# Patient Record
Sex: FEMALE
[De-identification: names, ages, dates, MRNs, and addresses within clinical notes are randomized per-mention and may not be internally consistent; named-entity substitution may affect disease eponyms.]

---

## 2018-02-04 ENCOUNTER — HOSPITAL ENCOUNTER (EMERGENCY)
Dept: HOSPITAL 14 - H.EROB2 | Age: 38
Discharge: HOME | End: 2018-02-04
Payer: MEDICAID

## 2018-02-04 VITALS — BODY MASS INDEX: 21.7 KG/M2

## 2018-02-04 DIAGNOSIS — O26.92: Primary | ICD-10-CM

## 2018-02-04 DIAGNOSIS — Z3A.24: ICD-10-CM

## 2018-02-04 DIAGNOSIS — R19.7: ICD-10-CM

## 2018-02-04 DIAGNOSIS — R10.2: ICD-10-CM

## 2018-02-05 VITALS
SYSTOLIC BLOOD PRESSURE: 94 MMHG | DIASTOLIC BLOOD PRESSURE: 60 MMHG | HEART RATE: 87 BPM | TEMPERATURE: 98.8 F | RESPIRATION RATE: 18 BRPM

## 2018-03-25 ENCOUNTER — HOSPITAL ENCOUNTER (EMERGENCY)
Dept: HOSPITAL 14 - H.EROB2 | Age: 38
Discharge: HOME | End: 2018-03-25
Payer: MEDICAID

## 2018-03-25 VITALS — BODY MASS INDEX: 25.7 KG/M2

## 2018-03-25 DIAGNOSIS — R10.2: ICD-10-CM

## 2018-03-25 DIAGNOSIS — O26.93: Primary | ICD-10-CM

## 2018-03-25 DIAGNOSIS — Z3A.31: ICD-10-CM

## 2018-03-25 LAB
BACTERIA #/AREA URNS HPF: (no result) /[HPF]
BILIRUB UR-MCNC: NEGATIVE MG/DL
COLOR UR: (no result)
GLUCOSE UR STRIP-MCNC: (no result) MG/DL
LEUKOCYTE ESTERASE UR-ACNC: (no result) LEU/UL
PH UR STRIP: 7 [PH] (ref 5–8)
PROT UR STRIP-MCNC: NEGATIVE MG/DL
RBC # UR STRIP: NEGATIVE /UL
SP GR UR STRIP: 1 (ref 1–1.03)
SQUAMOUS EPITHIAL: 1 /HPF (ref 0–5)
URINE CLARITY: CLEAR
UROBILINOGEN UR-MCNC: (no result) MG/DL (ref 0.2–1)

## 2018-03-26 VITALS
OXYGEN SATURATION: 99 % | SYSTOLIC BLOOD PRESSURE: 107 MMHG | RESPIRATION RATE: 20 BRPM | HEART RATE: 96 BPM | DIASTOLIC BLOOD PRESSURE: 67 MMHG | TEMPERATURE: 98.2 F

## 2018-05-16 ENCOUNTER — HOSPITAL ENCOUNTER (INPATIENT)
Dept: HOSPITAL 14 - H.EROB2 | Age: 38
LOS: 3 days | Discharge: HOME | DRG: 373 | End: 2018-05-19
Attending: OBSTETRICS & GYNECOLOGY | Admitting: OBSTETRICS & GYNECOLOGY
Payer: MEDICAID

## 2018-05-16 VITALS — BODY MASS INDEX: 26.6 KG/M2

## 2018-05-16 DIAGNOSIS — Z3A.39: ICD-10-CM

## 2018-05-16 LAB
BASOPHILS # BLD AUTO: 0 K/UL (ref 0–0.2)
BASOPHILS NFR BLD: 0.5 % (ref 0–2)
EOSINOPHIL # BLD AUTO: 0.1 K/UL (ref 0–0.7)
EOSINOPHIL NFR BLD: 1.1 % (ref 0–4)
ERYTHROCYTE [DISTWIDTH] IN BLOOD BY AUTOMATED COUNT: 13.4 % (ref 11.5–14.5)
HGB BLD-MCNC: 12.5 G/DL (ref 12–16)
LYMPHOCYTES # BLD AUTO: 1.3 K/UL (ref 1–4.3)
LYMPHOCYTES NFR BLD AUTO: 14.6 % (ref 20–40)
MCH RBC QN AUTO: 33 PG (ref 27–31)
MCHC RBC AUTO-ENTMCNC: 34.2 G/DL (ref 33–37)
MCV RBC AUTO: 96.3 FL (ref 81–99)
MONOCYTES # BLD: 0.6 K/UL (ref 0–0.8)
MONOCYTES NFR BLD: 7.1 % (ref 0–10)
NEUTROPHILS # BLD: 7 K/UL (ref 1.8–7)
NEUTROPHILS NFR BLD AUTO: 76.7 % (ref 50–75)
NRBC BLD AUTO-RTO: 0 % (ref 0–0)
PLATELET # BLD: 262 K/UL (ref 130–400)
PMV BLD AUTO: 8.8 FL (ref 7.2–11.7)
RBC # BLD AUTO: 3.78 MIL/UL (ref 3.8–5.2)
WBC # BLD AUTO: 9.2 K/UL (ref 4.8–10.8)

## 2018-05-16 PROCEDURE — 4A1HXCZ MONITORING OF PRODUCTS OF CONCEPTION, CARDIAC RATE, EXTERNAL APPROACH: ICD-10-PCS | Performed by: OBSTETRICS & GYNECOLOGY

## 2018-05-17 NOTE — OBDS
===================================

DELIVERY PERSONNEL

===================================

   

Delivery Doctor:  MEREDITH Whitt MD

Scrub Nurse:  Mercedes Tobias

Circulator:  Margy Ty RN

Anesthesiologist:  SHON Fontanez MD

Anesthetist:  Kael Fontanez MD (Annotations: Data stored by [facility] on behalf of user)

Resident:  

   

===================================

MATERNAL INFORMATION

===================================

   

Delivery Anesthesia:  Epidural

Medications in Delivery:  PITOCIN 30 UNITS  ML LR

Estimated  Blood Loss (ml):  150

Placenta Cultured:  No

Maternal Complications:  None

Provider Comments:   of live female w/ one loose nuchal cord, over an intact perineum at 02:55. N
ose and mouth suctioned, body delivered without difficulty. APGAR 9/9. Spontaneous Delivery of placen
ta with 3 vessel cord. Cord clamped and cut. Fundus firm, minimal bleeding. Cervix and Vagina were ex
amined for lacerations and none were noted. Pt tolerated procedure well. EBL 150ml. 

   Nico, PGY1

   Dr Whitt in attendance

      

      

    

   

===================================

LABOR SUMMARY

===================================

   

EDC:  2018 00:00

No. Babies in Womb:  1

 Attempted:  No

Labor Anesthesia:  Epidural

   

===================================

LABOR INFORMATION

===================================

   

Reason for Induction:  Not Applicable

Onset of Labor:  2018 15:00

Complete Dilatation:  2018 01:15

Oxytocin:  N/A

Group B Beta Strep:  Negative

Antibiotics # of Doses:  0

Steroids Given:  None

Reason Steroids Not Administered:  Not Applicable

   

===================================

MEMBRANES

===================================

   

Membranes Rupture Method:  Spontaneous

Rupture of Membranes:  2018 01:15

Length of Rupture (hrs):  1.67

Amniotic Fluid Color:  Light Meconium

Amniotic Fluid Amount:  Moderate

Amniotic Fluid Odor:  Normal

   

===================================

STAGES OF LABOR

===================================

   

Stage 1 hrs:  10

Stage 1 min:  15

Stage 2 hrs:  1

Stage 2 min:  40

Stage 3 hrs:  0

Stage 3 min:  5

Total Time in Labor hrs:  12

Total Time in Labor min:  0

   

===================================

VAGINAL DELIVERY

===================================

   

Episiotomy:  None

Laceration Extension:  N/A

Laceration Type:  None

Initial Vag Sponge Count:  5

Final Vag Sponge Count:  5

Initial Vag Sharps Count:  0

Final Vag Sharps Count:  0

Sponge Count Correct:  Yes

Sharps Count Correct:  N/A

   

===================================

BABY A INFORMATION

===================================

   

Infant Delivery Date/Time:  2018 02:55

Method of Delivery:  Vaginal

Born in Route :  No

:  N/A

Forceps:  N/A

Vacuum Extraction:  N/A

Shoulder Dystocia :  No

   

===================================

SHOULDER DYSTOCIA BABY A

===================================

   

Infant Delivery Date/Time:  2018 02:55

   

===================================

PRESENTATION/POSITION BABY A

===================================

   

Presentation:  Cephalic

Cephalic Presentation:  Vertex

Breech Presentation:  N/A

   

===================================

PLACENTA INFORMATION BABY A

===================================

   

Placenta Delivery Time :  2018 03:00

Placenta Method of Delivery:  Spontaneous

Placenta Status:  Delivered

   

===================================

APGAR SCORES BABY A

===================================

   

Heart Rate 1 min:  >100 bpm

Resp Effort 1 min:  Good Cry

Reflex Irritability 1 min:  Cough or Sneeze or Pulls Away

Muscle Tone 1 min:  Active Motion

Color 1 min:  Body Pink, Extremities Blue

Resuscitation Effort 1 min:  Tactile Stimulation

APGAR SCORE 1 MIN:  9

Heart Rate 5 min:  >100 bpm

Resp Effort 5 min:  Good Cry

Reflex Irritability 5 min:  Cough or Sneeze or Pulls Away

Muscle Tone 5 min:  Active Motion

Color 5 min:  Body Pink, Extremities Blue

Resuscitation Effort 5 min:  N/A

APGAR SCORE 5 MIN:  9

   

===================================

INFANT INFORMATION BABY A

===================================

   

Gestational Age at Delivery:  39.0

Gestational Status:  Term

Infant Outcome :  Liveborn

Infant Condition :  Stable

Infant Sex:  Female

   

===================================

IDENTIFICATION/MEDS BABY A

===================================

   

ID Band Number:  [id number]

ID Band Location:  Left Leg; Left Arm



   

===================================

WEIGHT/LENGTH BABY A

===================================

   

Infant Birthweight (gms):  3275

Infant Weight (lb):  7

Infant Weight (oz):  3

   

===================================

CORD INFORMATION BABY A

===================================

   

No. Cord Vessels:  1

Nuchal Cord :  Around Neck x1, Loose

Infant Suction:  None

   

===================================

ASSESSMENT BABY A

===================================

   

Infant Complications:  Meconium

Physical Findings at Delivery:  Within Normal Limits

Infant Respirations:  Appears Normal

Neonatologist/ALS Called :  No

Infant Care By:  ALTA DOOLEY

Transferred To:  Remains with Mother

## 2018-05-17 NOTE — RAD
HISTORY:

  



COMPARISON:

No prior. 



FINDINGS:



LUNGS:

No active pulmonary disease.



PLEURA:

No significant pleural effusion identified, no pneumothorax apparent.



CARDIOVASCULAR:

Normal.



OSSEOUS STRUCTURES:

No significant abnormalities.



VISUALIZED UPPER ABDOMEN:

Normal.



OTHER FINDINGS:

None.



IMPRESSION:

No active disease.

## 2018-05-18 LAB
ERYTHROCYTE [DISTWIDTH] IN BLOOD BY AUTOMATED COUNT: 13.8 % (ref 11.5–14.5)
HGB BLD-MCNC: 10.7 G/DL (ref 12–16)
MCH RBC QN AUTO: 33.2 PG (ref 27–31)
MCHC RBC AUTO-ENTMCNC: 34.6 G/DL (ref 33–37)
MCV RBC AUTO: 96 FL (ref 81–99)
PLATELET # BLD: 225 K/UL (ref 130–400)
RBC # BLD AUTO: 3.21 MIL/UL (ref 3.8–5.2)
WBC # BLD AUTO: 8.1 K/UL (ref 4.8–10.8)

## 2018-05-18 NOTE — OBPPN
===========================

Datetime: 2018 06:04

===========================

   

PP Pain Prov:  Within normal limits

PP Nausea Prov:  Denies

PP Flatus Prov:  Yes

PP BM Prov:  No

PP Impression Prov:  Normal postpartum progression

PP Plan Prov:  Continue present management

PP Progress Note Prov:  PPD 1

   S: 36 y/o female  s/p  on 18 evaluated on PPD1.  Pt was seen and examined at beds
[facility] this AM. No overnight events. Pt reports mild abdominal pain, but well controlled with pain meds.
 Ambulating. Breast feeding (with formula supplementation) without difficulty. Mild nipple soreness, 
improved with Lanolin. Lochia is similar to menses volume. No BM yet but passing gas per rectum.  Den
ies fever/chills, diarrhea, nausea/vomiting, chest pain, dyspnea, and dizziness. 

   O:

   VSS stable

   GEN: NAD

   Cardio: S1S2, no murmurs

   Lungs: clear breath sounds b/l, no wheezing

   Abdomen: BS+, appropriate tenderness to palpation.  Uterus is firm and at the level of the umbilic
us.

   EXT: No edema, calves nontender

   NEURO/PSYCH: AAOx3, no grossly focal deficits, preserved affect and mood.

   H/H (post partum): pending

   Assessment/Plan: 36 y/o female  s/p  on 18, doing well on PPD1.  Pt remains afebri
le, tolerating pain with medication. Tolerating diet. Anticipating d/c on . Continue with curren
t management. Cxray completed, reported normal.

   Encourage breastfeeding and ambulating

   Ibuprofen 600 q6 for pain

   Lanolin prn 

   Senakot 17.2 mg PO HS

   Robin Ramirez, PGY1

   OB Hospitalist note: Pt seen on rounds this morning. Agree with PGY1 note [facility]

IP PP Procedures:  None

Vital Signs Provider PP:  Reviewed; Within Normal Limits

## 2018-05-19 VITALS
OXYGEN SATURATION: 98 % | RESPIRATION RATE: 18 BRPM | SYSTOLIC BLOOD PRESSURE: 123 MMHG | HEART RATE: 93 BPM | TEMPERATURE: 97.4 F | DIASTOLIC BLOOD PRESSURE: 78 MMHG

## 2018-05-19 NOTE — OBPPN
===========================

Datetime: 2018 06:45

===========================

   

PP Pain Prov:  Within normal limits

PP Nausea Prov:  Denies

PP Flatus Prov:  Yes

PP BM Prov:  Yes

PP Impression Prov:  Normal postpartum progression

PP Plan Prov:  Continue present management; Discharge

PP Progress Note Prov:  PPD 2

   S: 38 y/o female  s/p  on 18 evaluated on PPD2.  Pt was seen and examined at beds
[facility] this AM. No overnight events. Pt reports mild abdominal pain, but well controlled with pain meds.
 Ambulating. Breast feeding (with formula supplementation) without difficulty. Lochia is similar to m
enses volume. 2 BM last night.  Denies fever/chills, diarrhea, nausea/vomiting, chest pain, dyspnea, 
and dizziness. 

   O:

   VSS stable overnight

   GEN: NAD

   Cardio: S1S2, no murmurs

   Lungs: clear breath sounds b/l, no wheezing

   Abdomen: BS+, appropriate tenderness to palpation.  Uterus is firm and at the level of the umbilic
us.

   EXT: No edema, calves nontender

   NEURO/PSYCH: AAOx3, no grossly focal deficits, preserved affect and mood.

   H/H (post partum): 10.7/30.8

   Assessment/Plan: 38 y/o female  s/p  on 18, doing well on PPD2.  Pt remains afebri
le, tolerating pain with medication. Tolerating diet. Anticipating d/c today. Continue with current m
anagement..

   Encourage breastfeeding and ambulating

   Ibuprofen 600 q6 for pain

   Start Fe Sulfate 325 BID on discharge

   Lanolin prn 

   Senakot 17.2 mg PO HS

   Robin Ramirez PGY1

      

   OB Hospitalist note : Pt was seen on rounds and agree with PGY1 note - D/C home [facility]

Vital Signs Provider PP:  Reviewed; Within Normal Limits

## 2018-05-19 NOTE — OBDCSUM
===========================

Datetime: 2018 06:48

===========================

   

Discharged to, Provider:  Home

Follow up at, Provider:  MD

Disch Instr Activity:  Normal activity; May be up to bathroom; May be up for meals; May Shower

Disch Instr Diet:  Regular

Discharge Instructions, Provider:  Routine instructions given

Discharge Diagnosis, Provider:  Term Pregnancy Delivered

Follow up in weeks, Provider:  4-6

Disch Activity Restrictions:  No lifting; No sexual activity; Nothing in vagina - [place], tampon
s, douche

Discharge Comment, Provider:  Diagnosis: 36 y/o female  s/p  on 18

   : Female, 3275g, APGAR 9/9

   Post Partum Summary: No complications during post partum period. H/H- 10.7/36.4. Cxray completed p
ost discharge and found to be wnl.

   Discharge Instructions:

   1. Encourage breastfeeding

   2. PNV 1 tab po daily

   3. Ibuprofen 600mg 1 tab prn for mild-mod pain

   4. ER precations: If excessive bleeding or fever without relief from medication, go to ED

   5. F/U in 4-6 weeks 

      

Contraception after Delivery:  Undecided

## 2024-10-28 NOTE — OBHP
===========================

Datetime: 2018 16:36

===========================

   

IP Adm Impression:  , intrauterine pregnancy

IP Admit Plan:  Observation/Evaluation; Discharge home

Admit Comment, IP Provider:  38 yo  IUP 24.6 weeks presents today c/o 3 episodes of diarrhea s
jesus this morning, no blood or mucous, also reports having cramping abdominal pain yesterday that res
olves spontaneously. Denies lof, vb, +FM. Denies headache, blurred vision, no chest pain or any other
 pain, palpitations, no recent F/N/V. Patient states that her daughter has diarrhea also since 3-4 da
ys ago.

   [facility]: [facility]

   POb: , NVD x1 , FT

   Pgyn: none

   PMH: denies.

   FMH: denies.

   PSH: none

   Meds: pnv

   NKDA.

   SH: - tobacco, etoh, drugs.

   VS: wnl

   FHR: 160/mod talita/cat 1/no decel.

   [place]: quiet

   A/P: 38 yo  IUP 24.6 weeks with diarrhea.

   IV fluids

   Observation

   Case discussed with Dr Domínguez.

   Sudhakar PGY 1

   OB attending addendum:

   Patient seen and examined by me agree with the above assessment and plan. Of note patient denied c
ontractions cramping or vaginal bleeding or spontaneous rupture of membranes Patient advised to follo
w-up with OB doctor this week

Pelvic Type - PN:  Adequate

Extremities - PN:  Normal

Abdomen - PN:  Normal

Back - PN:  Normal

Breast - PN:  Normal

Lungs - PN:  Normal

Heart - PN:  Normal

Thyroid - PN:  Normal

Neurologic - PN:  Normal

HEENT - PN:  Normal

General - PN:  Normal

FHR - Baseline A Provider:  160

Contraction Comments Provider:  quiet

EGA AdmitDate IP:  24.6

Vital Signs Provider:  Reviewed; Within Normal Limits

IP Chief Complaint:  Maternal discomfort

NICHD Variability Prov Fetus A:  Moderate 6-25bpm

NICHD Accel Fetus A IP Provider:  [id number]

FHR Category Provider Fetus A:  Category I

NICHD Decel Fetus A IP Provider:  None

Genitourinary Exam:  Normal

DTRs - PN:  Normal
doctor if you think you are having a problem with your medicine.     If your doctor recommends aspirin, take the amount directed each day. Make sure you take aspirin and not another kind of pain reliever, such as acetaminophen (Tylenol).   When should you call for help?   Call 911 if you have symptoms of a heart attack. These may include:    Chest pain or pressure, or a strange feeling in the chest.     Sweating.     Shortness of breath.     Pain, pressure, or a strange feeling in the back, neck, jaw, or upper belly or in one or both shoulders or arms.     Lightheadedness or sudden weakness.     A fast or irregular heartbeat.   After you call 911, the  may tell you to chew 1 adult-strength or 2 to 4 low-dose aspirin. Wait for an ambulance. Do not try to drive yourself.  Watch closely for changes in your health, and be sure to contact your doctor if you have any problems.  Where can you learn more?  Go to https://www.[employer].net/patientEd and enter F075 to learn more about \"A Healthy Heart: Care Instructions.\"  Current as of: June 24, 2023  Content Version: 14.2  © 2024 [employer].   Care instructions adapted under license by [employer]. If you have questions about a medical condition or this instruction, always ask your healthcare professional. [employer] disclaims any warranty or liability for your use of this information.      Personalized Preventive Plan for Nani Reid - 10/28/2024  Medicare offers a range of preventive health benefits. Some of the tests and screenings are paid in full while other may be subject to a deductible, co-insurance, and/or copay.    Some of these benefits include a comprehensive review of your medical history including lifestyle, illnesses that may run in your family, and various assessments and screenings as appropriate.    After reviewing your medical record and screening and assessments performed today your provider may have ordered